# Patient Record
Sex: FEMALE | Race: WHITE | NOT HISPANIC OR LATINO | Employment: FULL TIME | URBAN - METROPOLITAN AREA
[De-identification: names, ages, dates, MRNs, and addresses within clinical notes are randomized per-mention and may not be internally consistent; named-entity substitution may affect disease eponyms.]

---

## 2017-08-02 ENCOUNTER — HOSPITAL ENCOUNTER (OUTPATIENT)
Dept: GENERAL RADIOLOGY | Facility: HOSPITAL | Age: 52
Discharge: HOME | End: 2017-08-02
Attending: INTERNAL MEDICINE | Admitting: INTERNAL MEDICINE

## 2017-08-02 ENCOUNTER — TRANSCRIBE ORDERS (OUTPATIENT)
Dept: ADMINISTRATIVE | Facility: HOSPITAL | Age: 52
End: 2017-08-02

## 2017-08-02 DIAGNOSIS — R05.3 CHRONIC COUGH: Primary | ICD-10-CM

## 2017-08-02 DIAGNOSIS — R05.3 CHRONIC COUGH: ICD-10-CM

## 2017-08-02 PROCEDURE — 71020 HC CHEST PA AND LATERAL: CPT

## 2017-08-07 ENCOUNTER — TRANSCRIBE ORDERS (OUTPATIENT)
Dept: ADMINISTRATIVE | Facility: HOSPITAL | Age: 52
End: 2017-08-07

## 2017-08-07 DIAGNOSIS — R01.1 HEART MURMUR: Primary | ICD-10-CM

## 2017-08-10 ENCOUNTER — APPOINTMENT (OUTPATIENT)
Dept: CARDIOLOGY | Facility: HOSPITAL | Age: 52
End: 2017-08-10
Attending: INTERNAL MEDICINE

## 2018-08-10 ENCOUNTER — LAB (OUTPATIENT)
Dept: LAB | Facility: HOSPITAL | Age: 53
End: 2018-08-10

## 2018-08-10 ENCOUNTER — TRANSCRIBE ORDERS (OUTPATIENT)
Dept: LAB | Facility: HOSPITAL | Age: 53
End: 2018-08-10

## 2018-08-10 DIAGNOSIS — N95.1 MENOPAUSAL SYMPTOMS: Primary | ICD-10-CM

## 2018-08-10 DIAGNOSIS — N95.1 MENOPAUSAL SYMPTOMS: ICD-10-CM

## 2018-08-10 LAB
ESTRADIOL SERPL HS-MCNC: 91 PG/ML
FSH SERPL-ACNC: 29.1 MIU/ML
PROGEST SERPL-MCNC: 0.49 NG/ML
TESTOST SERPL-MCNC: 19.38 NG/DL (ref 0–780.1)

## 2018-08-10 PROCEDURE — 36415 COLL VENOUS BLD VENIPUNCTURE: CPT

## 2018-08-10 PROCEDURE — 84144 ASSAY OF PROGESTERONE: CPT

## 2018-08-10 PROCEDURE — 84403 ASSAY OF TOTAL TESTOSTERONE: CPT

## 2018-08-10 PROCEDURE — 83001 ASSAY OF GONADOTROPIN (FSH): CPT

## 2018-08-10 PROCEDURE — 82670 ASSAY OF TOTAL ESTRADIOL: CPT

## 2020-09-09 ENCOUNTER — HOSPITAL ENCOUNTER (OUTPATIENT)
Dept: GENERAL RADIOLOGY | Facility: HOSPITAL | Age: 55
Discharge: HOME | End: 2020-09-09
Admitting: INTERNAL MEDICINE

## 2020-09-09 ENCOUNTER — TRANSCRIBE ORDERS (OUTPATIENT)
Dept: ADMINISTRATIVE | Facility: HOSPITAL | Age: 55
End: 2020-09-09

## 2020-09-09 DIAGNOSIS — M79.672 PAIN OF LEFT HEEL: ICD-10-CM

## 2020-09-09 DIAGNOSIS — M25.511 RIGHT SHOULDER PAIN, UNSPECIFIED CHRONICITY: ICD-10-CM

## 2020-09-09 DIAGNOSIS — M25.511 RIGHT SHOULDER PAIN, UNSPECIFIED CHRONICITY: Primary | ICD-10-CM

## 2020-09-09 PROCEDURE — 73030 X-RAY EXAM OF SHOULDER: CPT

## 2020-09-09 PROCEDURE — 73650 X-RAY EXAM OF HEEL: CPT

## 2020-09-15 ENCOUNTER — TRANSCRIBE ORDERS (OUTPATIENT)
Dept: ADMINISTRATIVE | Facility: HOSPITAL | Age: 55
End: 2020-09-15

## 2020-09-15 DIAGNOSIS — I34.1 MITRAL VALVE PROLAPSE: Primary | ICD-10-CM

## 2020-09-22 ENCOUNTER — HOSPITAL ENCOUNTER (OUTPATIENT)
Dept: CARDIOLOGY | Facility: HOSPITAL | Age: 55
Discharge: HOME | End: 2020-09-22
Admitting: INTERNAL MEDICINE

## 2020-09-22 VITALS — HEIGHT: 70 IN | WEIGHT: 148 LBS | BODY MASS INDEX: 21.19 KG/M2

## 2020-09-22 DIAGNOSIS — I34.1 MITRAL VALVE PROLAPSE: ICD-10-CM

## 2020-09-22 LAB
BH CV ECHO MEAS - AO ROOT AREA (BSA CORRECTED): 1.4
BH CV ECHO MEAS - AO ROOT AREA: 4.9 CM^2
BH CV ECHO MEAS - AO ROOT DIAM: 2.5 CM
BH CV ECHO MEAS - ASC AORTA: 2.5 CM
BH CV ECHO MEAS - BSA(HAYCOCK): 1.8 M^2
BH CV ECHO MEAS - BSA: 1.8 M^2
BH CV ECHO MEAS - BZI_BMI: 21.2 KILOGRAMS/M^2
BH CV ECHO MEAS - BZI_METRIC_HEIGHT: 177.8 CM
BH CV ECHO MEAS - BZI_METRIC_WEIGHT: 67.1 KG
BH CV ECHO MEAS - EDV(CUBED): 96.7 ML
BH CV ECHO MEAS - EDV(MOD-SP2): 48 ML
BH CV ECHO MEAS - EDV(MOD-SP4): 61 ML
BH CV ECHO MEAS - EDV(TEICH): 96.8 ML
BH CV ECHO MEAS - EF(CUBED): 71.2 %
BH CV ECHO MEAS - EF(MOD-BP): 60 %
BH CV ECHO MEAS - EF(MOD-SP2): 60.4 %
BH CV ECHO MEAS - EF(MOD-SP4): 60.7 %
BH CV ECHO MEAS - EF(TEICH): 63 %
BH CV ECHO MEAS - ESV(CUBED): 27.8 ML
BH CV ECHO MEAS - ESV(MOD-SP2): 19 ML
BH CV ECHO MEAS - ESV(MOD-SP4): 24 ML
BH CV ECHO MEAS - ESV(TEICH): 35.9 ML
BH CV ECHO MEAS - FS: 34 %
BH CV ECHO MEAS - IVS/LVPW: 0.99
BH CV ECHO MEAS - IVSD: 0.93 CM
BH CV ECHO MEAS - LA DIMENSION: 2.8 CM
BH CV ECHO MEAS - LA/AO: 1.1
BH CV ECHO MEAS - LAD MAJOR: 4.9 CM
BH CV ECHO MEAS - LAT PEAK E' VEL: 13.6 CM/SEC
BH CV ECHO MEAS - LATERAL E/E' RATIO: 5.3
BH CV ECHO MEAS - LV DIASTOLIC VOL/BSA (35-75): 33.2 ML/M^2
BH CV ECHO MEAS - LV MASS(C)D: 144.8 GRAMS
BH CV ECHO MEAS - LV MASS(C)DI: 78.8 GRAMS/M^2
BH CV ECHO MEAS - LV SYSTOLIC VOL/BSA (12-30): 13.1 ML/M^2
BH CV ECHO MEAS - LVIDD: 4.6 CM
BH CV ECHO MEAS - LVIDS: 3 CM
BH CV ECHO MEAS - LVLD AP2: 6.6 CM
BH CV ECHO MEAS - LVLD AP4: 7.8 CM
BH CV ECHO MEAS - LVLS AP2: 6.4 CM
BH CV ECHO MEAS - LVLS AP4: 7.3 CM
BH CV ECHO MEAS - LVPWD: 0.94 CM
BH CV ECHO MEAS - MED PEAK E' VEL: 11.4 CM/SEC
BH CV ECHO MEAS - MEDIAL E/E' RATIO: 6.3
BH CV ECHO MEAS - MV A MAX VEL: 43.4 CM/SEC
BH CV ECHO MEAS - MV DEC SLOPE: 356 CM/SEC^2
BH CV ECHO MEAS - MV E MAX VEL: 71.6 CM/SEC
BH CV ECHO MEAS - MV E/A: 1.6
BH CV ECHO MEAS - MV P1/2T MAX VEL: 97.7 CM/SEC
BH CV ECHO MEAS - MV P1/2T: 80.4 MSEC
BH CV ECHO MEAS - MVA P1/2T LCG: 2.3 CM^2
BH CV ECHO MEAS - MVA(P1/2T): 2.7 CM^2
BH CV ECHO MEAS - PA ACC SLOPE: 504 CM/SEC^2
BH CV ECHO MEAS - PA ACC TIME: 0.14 SEC
BH CV ECHO MEAS - PA PR(ACCEL): 14.9 MMHG
BH CV ECHO MEAS - RAP SYSTOLE: 3 MMHG
BH CV ECHO MEAS - RVDD: 2 CM
BH CV ECHO MEAS - RVSP: 21 MMHG
BH CV ECHO MEAS - SI(CUBED): 37.5 ML/M^2
BH CV ECHO MEAS - SI(MOD-SP2): 15.8 ML/M^2
BH CV ECHO MEAS - SI(MOD-SP4): 20.1 ML/M^2
BH CV ECHO MEAS - SI(TEICH): 33.2 ML/M^2
BH CV ECHO MEAS - SV(CUBED): 68.9 ML
BH CV ECHO MEAS - SV(MOD-SP2): 29 ML
BH CV ECHO MEAS - SV(MOD-SP4): 37 ML
BH CV ECHO MEAS - SV(TEICH): 61 ML
BH CV ECHO MEAS - TAPSE (>1.6): 2 CM
BH CV ECHO MEAS - TR MAX PG: 18 MMHG
BH CV ECHO MEAS - TR MAX VEL: 212 CM/SEC
BH CV ECHO MEASUREMENTS AVERAGE E/E' RATIO: 5.73
BH CV VAS BP LEFT ARM: NORMAL MMHG
BH CV XLRA - RV BASE: 2.5 CM
BH CV XLRA - RV LENGTH: 6.5 CM
BH CV XLRA - RV MID: 2.2 CM
BH CV XLRA - TDI S': 9.2 CM/SEC
LEFT ATRIUM VOLUME INDEX: 14.7 ML/M^2
LEFT ATRIUM VOLUME: 27 ML
LV EF 2D ECHO EST: 60 %
MAXIMAL PREDICTED HEART RATE: 167 BPM
STRESS TARGET HR: 142 BPM

## 2020-09-22 PROCEDURE — 93306 TTE W/DOPPLER COMPLETE: CPT

## 2020-09-22 PROCEDURE — 93306 TTE W/DOPPLER COMPLETE: CPT | Performed by: INTERNAL MEDICINE

## 2020-10-26 ENCOUNTER — TRANSCRIBE ORDERS (OUTPATIENT)
Dept: ADMINISTRATIVE | Facility: HOSPITAL | Age: 55
End: 2020-10-26

## 2020-10-26 DIAGNOSIS — M25.511 RIGHT SHOULDER PAIN, UNSPECIFIED CHRONICITY: Primary | ICD-10-CM

## 2020-11-05 ENCOUNTER — HOSPITAL ENCOUNTER (OUTPATIENT)
Dept: MRI IMAGING | Facility: HOSPITAL | Age: 55
Discharge: HOME | End: 2020-11-05
Admitting: INTERNAL MEDICINE

## 2020-11-05 DIAGNOSIS — M25.511 RIGHT SHOULDER PAIN, UNSPECIFIED CHRONICITY: ICD-10-CM

## 2020-11-05 PROCEDURE — 73221 MRI JOINT UPR EXTREM W/O DYE: CPT

## 2020-12-21 NOTE — PROGRESS NOTES
[facility] Cardiology  Consultation note    Subjective:     Patient ID: Nohelia Joya is a 55 y.o. female.  Referring provider: Sunita Little MD     Reason for consultation:   Chief Complaint   Patient presents with   • Mitral Valve Prolapse     Consult   • Slow Heart Rate        Problem List:  1. Mitral Valve Prolapse  a. Echocardiogram: [facility] 9/22/2020- EF 60%, Mild MVP of the posterior leaflet. Minimal myxomatous leaflet changes see. Trace TR, Trace MR. RVSP 21 mmHg      No Known Allergies      Current Outpatient Medications:   •  vitamin B-12 (CYANOCOBALAMIN) 1000 MCG tablet, Take 1,000 mcg by mouth Daily., Disp: , Rfl:   •  vitamin D3 125 MCG (5000 UT) capsule capsule, Take 5,000 Units by mouth Daily., Disp: , Rfl:     HPI:      Nohelia Joya is a 55 y.o. female who present today to establish care for her mitral valve prolapse.  She is not having any problems currently but had a follow-up echo for history of mitral valve prolapse with an echocardiogram performed many years ago.  She has occasional palpitations but they are not bothersome to her.  They have been ongoing for some time and are not frequent.  She walks and jumps on the trampoline 3-4 times a week.    Cardiac Risk Factors:  The following portions of the patient's history were reviewed and updated as appropriate: allergies, current medications and problem list.Cardiac risk factors are negative for tobacco use hypertension hyperlipidemia family history of coronary disease and diabetes.    Social History     Socioeconomic History   • Marital status: Single     Spouse name: Not on file   • Number of children: Not on file   • Years of education: Not on file   • Highest education level: Not on file   Tobacco Use   • Smoking status: Never Smoker   • Smokeless tobacco: Never Used   Substance and Sexual Activity   • Alcohol use: Never     Frequency: Never   • Drug use: Defer   • Sexual activity: Defer     Family History    Problem Relation Age of Onset   • Pneumonia Mother    • Dementia Mother    Father had a history of hypercholesterolemia and hypertension    Review of Systems:    Constitution: Negative for chills, fatigue, fever, and generalized weakness.   Cardiovascular: Pertinent positives in HPI, otherwise negative.  Respiratory: Pertinent positives in HPI, otherwise negative.  HENT: Negative for ear pain, nosebleeds, and tinnitus.  Gastrointestinal: Negative for abdominal pain, constipation, diarrhea, nausea and vomiting.   Genitourinary: No urinary symptoms   Musculoskeletal: Negative for muscle cramps.  Neurological: Negative for headaches, loss of balance, numbness, and symptoms of stroke.  Psychiatric: Normal mental status.           Objective:     Vitals:    12/23/20 0850   BP: 128/68   Pulse: (!) 46   Temp: 97.5 °F (36.4 °C)   SpO2: 99%         GENERAL: This is a well-developed, well-nourished, white female who is in no acute distress. Alert and oriented x3. Normal mood and affect.   SKIN: Pink and warm without rash or abnormality noted.   HEENT: Head is normocephalic and atraumatic. Pupils are equal and reactive to light bilaterally. Mucous membranes are pink and moist.   NECK: Supple without lymphadenopathy or thyromegaly. There is no jugular venous distention at 30°.  LUNGS: Clear to auscultation bilaterally without wheezing, rhonchi, or rales noted.   CARDIOVASCULAR: The heart has a regular rate with a normal S1 and physiologic split S2. There is no murmur, gallop, rub, or click appreciated. The PMI is nondisplaced. Carotid upstrokes are 2+ and symmetrical without bruits.   ABDOMEN: Soft and nondistended with positive bowel sounds x4. The patient denies tenderness of palpitation.   MUSCULOSKELETAL: There are no obvious bony abnormalities. Normal range of tenderness to palpation.   NEUROLOGICAL: Nonfocal.   PERIPHERAL VASCULAR: Femoral pulses are 2+ and symmetrical without bruits. Posterior tibial and dorsalis pedis  pulses are 2+ and symmetrical. There is no peripheral edema.     Diagnostic Data (reviewed):      Labs: 9/2/2020    Hgb  13.3g/dL  HCT 38.9%    BUN  17 mg/dL  Cr  0.80 mg/dL  NA  142 mmol/L  K+  4.1 mmol/L  Ca  9.8 mg/dL  Chl  107 mmol/L  Albumin 4.5 g/dL    TSH  4.26 uIU/mL  Free      Thyroxine 1.20 ng/dL    Total Chol 181 mg/dL  Trig  67 mg/dL  HDL  57.1 mg/dL  LDL  113.2 mg/dL    Bit b12 347 pg/mL  Vit D  22.6 ng/mL         ECG 12 Lead    Date/Time: 12/23/2020 9:27 AM  Performed by: Rosa Maria Gibbs MD  Authorized by: Rosa Maria Gibbs MD   Previous ECG: no previous ECG available  Rhythm: sinus bradycardia  BPM: 46  Comments: Cannot exclude previous anterior infarct.                Assessment:       ICD-10-CM ICD-9-CM   1. Mitral valve posterior leaflet prolapse, mild I34.1 424.0   We discussed symptoms of dyspnea or an increasing number of palpitations should her mitral valve prolapse ever worsen.  Based on her minimal myxomatous change and very mild posterior leaflet prolapse I suspect that it will not worsen over time.        Plan:     1. No further cardiac evaluation is required at this time.  2. Follow-up as needed.      Rosa Maria Gibbs MD, [facility]

## 2020-12-23 ENCOUNTER — CONSULT (OUTPATIENT)
Dept: CARDIOLOGY | Facility: CLINIC | Age: 55
End: 2020-12-23

## 2020-12-23 VITALS
TEMPERATURE: 97.5 F | DIASTOLIC BLOOD PRESSURE: 68 MMHG | WEIGHT: 143.2 LBS | HEIGHT: 70 IN | OXYGEN SATURATION: 99 % | SYSTOLIC BLOOD PRESSURE: 128 MMHG | HEART RATE: 46 BPM | BODY MASS INDEX: 20.5 KG/M2

## 2020-12-23 DIAGNOSIS — I34.1 MITRAL VALVE POSTERIOR LEAFLET PROLAPSE: Primary | ICD-10-CM

## 2020-12-23 PROCEDURE — 99203 OFFICE O/P NEW LOW 30 MIN: CPT | Performed by: INTERNAL MEDICINE

## 2020-12-23 PROCEDURE — 93000 ELECTROCARDIOGRAM COMPLETE: CPT | Performed by: INTERNAL MEDICINE

## 2020-12-23 RX ORDER — LANOLIN ALCOHOL/MO/W.PET/CERES
1000 CREAM (GRAM) TOPICAL DAILY
COMMUNITY
End: 2023-02-14

## 2023-02-07 DIAGNOSIS — Z00.6 EXAMINATION FOR NORMAL COMPARISON OR CONTROL IN CLINICAL RESEARCH: Primary | ICD-10-CM

## 2023-02-14 ENCOUNTER — OFFICE VISIT (OUTPATIENT)
Dept: PULMONOLOGY | Facility: CLINIC | Age: 58
End: 2023-02-14
Payer: COMMERCIAL

## 2023-02-14 VITALS
HEART RATE: 63 BPM | TEMPERATURE: 98.4 F | HEIGHT: 70 IN | WEIGHT: 155 LBS | SYSTOLIC BLOOD PRESSURE: 122 MMHG | RESPIRATION RATE: 14 BRPM | BODY MASS INDEX: 22.19 KG/M2 | OXYGEN SATURATION: 99 % | DIASTOLIC BLOOD PRESSURE: 68 MMHG

## 2023-02-14 DIAGNOSIS — R91.1 PULMONARY NODULE: Primary | ICD-10-CM

## 2023-02-14 PROCEDURE — 99204 OFFICE O/P NEW MOD 45 MIN: CPT | Performed by: INTERNAL MEDICINE

## 2023-02-14 NOTE — PROGRESS NOTES
[facility]     Patient Name: Nohelia Joya    Primary Care Physician: Sunita Little MD    Referring Physician: Sunita Little MD    Chief Complaint:    Chief Complaint   Patient presents with   • Lung Nodule     Subjective      History of Present Illness: Nohelia Joya is a 58 y.o. female who is here today for evaluation of abnormal thoracic imaging.  Patient denies history of underlying pulmonary disease.  She is a non-smoker. A right middle lobe nodule (reportedly 6 mm in diameter) was initially discovered in 2022.  This was followed up in February 2023 which showed overall stability in terms of size, shape.  Patient referred to pulmonary by PCP to evaluate serial imaging and provide recommendations on follow-up.    Review of Systems: Fourteen point review of system performed and negative except for that mentioned in HPI.     Past Medical History:   Past Medical History:   Diagnosis Date   • Heart murmur    • History of echocardiogram    • Mitral valve prolapse      Past Surgical History:   Past Surgical History:   Procedure Laterality Date   • LIPOMA EXCISION      left shoulder   • WISDOM TOOTH EXTRACTION       Family History:   Family History   Problem Relation Age of Onset   • Pneumonia Mother    • Dementia Mother      Social History:   Social History     Socioeconomic History   • Marital status: Single   Tobacco Use   • Smoking status: Never   • Smokeless tobacco: Never   Substance and Sexual Activity   • Alcohol use: Never   • Drug use: Defer   • Sexual activity: Defer     Medications:     Current Outpatient Medications:   •  vitamin B-12 (CYANOCOBALAMIN) 1000 MCG tablet, Take 1,000 mcg by mouth Daily., Disp: , Rfl:   •  vitamin D3 125 MCG (5000 UT) capsule capsule, Take 5,000 Units by mouth Daily., Disp: , Rfl:     Allergies:   No Known Allergies    Immunizations:   Immunization History   Administered Date(s) Administered   • COVID-19 (PFIZER) PURPLE CAP 03/12/2021,  04/02/2021, 01/10/2022     Objective     Physical Exam:  Vitals:    02/14/23 1256   BP: 122/68   Pulse: 63   Resp: 14   Temp: 98.4 °F (36.9 °C)   SpO2: 99%     General: The patient appears in no acute distress. Alert, cooperative and interactive.  HEENT:NC/AT, PERRL, Normal nasal mucosa, MMM.  Neck: Trachea midline, No masses.  Lymphadenopathy: No palpable anterior cervical, submandibular, submental, or posterior cervical lymphadenopathy. No palpable supra- or infraclavicular lymphadenopathy.   Chest: Clear to auscultation bilaterally. No wheezing, rhonchi, or rales. Normal work of breathing. Equal chest rise.  Cardiac: Regular rhythm, normal rate, S1S2 auscultated. No murmurs, rubs or gallops.   Extremities: No lower extremity edema. No clubbing or cyanosis.  Skin: No rashes, open wounds, or bruising. Warm, dry, well-perfused.  Neuro: Motor power grossly intact bilaterally. Sensation intact. Speech fluid and fluent. Thought process coherent.  Psych: Alert and oriented x3. Mood stable.    Assessment / Plan      # Pulmonary nodule    Personally reviewed CT chest.  There is a right middle lobe nodule approximately 5 to 6 mm in diameter.  Patient denies constitutional symptoms, cough, weight loss, shortness of breath, chest pain.  She is a never smoker.  Risk of underlying malignancy remains low.  We will continue to monitor at 1x year interval based on Fleischner criteria.  If stable after 2-3x years may discontinue serial imaging.  Discussed this plan with patient.  Will follow-up in clinic in February 2024 after repeat CT.    I discussed the diagnosis, evaluation, and  treatment options with the patient and/or appropriate family members.    Thank you for allowing me to participate in the care of Nohelia. Please feel free to contact me with any questions or concerns.    Time: Level of service justified based on 50 minutes spent in patient care on this date of service including, but not limited to: preparing to see the  patient, obtaining and/or reviewing history, performing medically appropriate examination, ordering tests/medicine/procedures, independently interpreting results, documenting clinical information in EHR, and counseling/education of patient/family/caregiver (excluding time spent on other separate services such as performing procedures or test interpretation, if applicable). (Level 4 45-59 minutes; Level 5 60-74 minutes)    -- Phill Mirza MD  Pulmonary, Critical Care and Sleep Medicine

## 2023-02-16 DIAGNOSIS — R91.1 LUNG NODULE: ICD-10-CM

## 2024-02-05 ENCOUNTER — HOSPITAL ENCOUNTER (OUTPATIENT)
Dept: CT IMAGING | Facility: HOSPITAL | Age: 59
Discharge: HOME | End: 2024-02-05
Admitting: INTERNAL MEDICINE
Payer: COMMERCIAL

## 2024-02-05 DIAGNOSIS — R91.1 LUNG NODULE: ICD-10-CM

## 2024-02-05 PROCEDURE — 71250 CT THORAX DX C-: CPT

## 2024-05-28 ENCOUNTER — OFFICE VISIT (OUTPATIENT)
Dept: PULMONOLOGY | Facility: CLINIC | Age: 59
End: 2024-05-28
Payer: COMMERCIAL

## 2024-05-28 VITALS
DIASTOLIC BLOOD PRESSURE: 70 MMHG | BODY MASS INDEX: 22.75 KG/M2 | OXYGEN SATURATION: 98 % | SYSTOLIC BLOOD PRESSURE: 122 MMHG | TEMPERATURE: 97.6 F | WEIGHT: 158.9 LBS | HEART RATE: 60 BPM | HEIGHT: 70 IN

## 2024-05-28 DIAGNOSIS — R91.1 PULMONARY NODULE: Primary | ICD-10-CM

## 2024-05-28 PROCEDURE — 94726 PLETHYSMOGRAPHY LUNG VOLUMES: CPT | Performed by: NURSE PRACTITIONER

## 2024-05-28 PROCEDURE — 94375 RESPIRATORY FLOW VOLUME LOOP: CPT | Performed by: NURSE PRACTITIONER

## 2024-05-28 PROCEDURE — 99214 OFFICE O/P EST MOD 30 MIN: CPT | Performed by: NURSE PRACTITIONER

## 2024-05-28 PROCEDURE — 94729 DIFFUSING CAPACITY: CPT | Performed by: NURSE PRACTITIONER

## 2024-05-28 NOTE — PROGRESS NOTES
[facility] Pulmonary Follow up    CHIEF COMPLAINT    Pulmonary nodule    HISTORY OF PRESENT ILLNESS    Nohelia Joya is a 59 y.o.female here today for follow-up.  She was last seen in the office by Dr. Mirza in February 2023.  She had a right middle lobe nodule initially discovered in 2022.  She had a repeat CT scan in 2023 that showed stability in the nodule.  She had a repeat CT scan in February 2024 and is here today to discuss results.    She did have a cold over the winter and required antibiotics but has not had anything since then.  She denies any breathing difficulties.    She denies any sputum production or hemoptysis.  She has a chest pain or palpitations.  Denies any lower extremity edema or calf tenderness.    She denies any reflux symptoms.    She is a lifetime non-smoker.  She has no first-degree relatives with history of lung cancer.    Patient Active Problem List   Diagnosis    Mitral valve posterior leaflet prolapse    Pulmonary nodule       No Known Allergies    Current Outpatient Medications:     vitamin D3 125 MCG (5000 UT) capsule capsule, Take 1 capsule by mouth Daily., Disp: , Rfl:   MEDICATION LIST AND ALLERGIES REVIEWED.    Social History     Tobacco Use    Smoking status: Never     Passive exposure: Past    Smokeless tobacco: Never   Vaping Use    Vaping status: Never Used   Substance Use Topics    Alcohol use: Never    Drug use: Never       FAMILY AND SOCIAL HISTORY REVIEWED.    Review of Systems   Constitutional:  Negative for activity change, appetite change, fatigue, fever and unexpected weight change.   HENT:  Negative for congestion, postnasal drip, rhinorrhea, sinus pressure, sore throat and voice change.    Eyes:  Negative for visual disturbance.   Respiratory:  Negative for cough, chest tightness, shortness of breath and wheezing.    Cardiovascular:  Negative for chest pain, palpitations and leg swelling.   Gastrointestinal:  Negative for abdominal distention, abdominal pain,  "nausea and vomiting.   Endocrine: Negative for cold intolerance and heat intolerance.   Genitourinary:  Negative for difficulty urinating and urgency.   Musculoskeletal:  Negative for arthralgias, back pain and neck pain.   Skin:  Negative for color change and pallor.   Allergic/Immunologic: Negative for environmental allergies and food allergies.   Neurological:  Negative for dizziness, syncope, weakness and light-headedness.   Hematological:  Negative for adenopathy. Does not bruise/bleed easily.   Psychiatric/Behavioral:  Negative for agitation and behavioral problems.    .    /70   Pulse 60   Temp 97.6 °F (36.4 °C)   Ht 177.8 cm (70\")   Wt 72.1 kg (158 lb 14.4 oz)   SpO2 98% Comment: resting, room air  BMI 22.80 kg/m²     Immunization History   Administered Date(s) Administered    COVID-19 (PFIZER) Purple Cap Monovalent 03/12/2021, 04/02/2021, 01/10/2022    Fluzone (or Fluarix & Flulaval for VFC) >6mos 09/09/2020, 10/23/2023    Shingrix 10/22/2021, 05/16/2022    Tdap 08/01/2022       Physical Exam  Vitals and nursing note reviewed.   Constitutional:       Appearance: She is well-developed. She is not diaphoretic.   HENT:      Head: Normocephalic and atraumatic.   Eyes:      Pupils: Pupils are equal, round, and reactive to light.   Neck:      Thyroid: No thyromegaly.   Cardiovascular:      Rate and Rhythm: Normal rate and regular rhythm.      Heart sounds: Normal heart sounds. No murmur heard.     No friction rub. No gallop.   Pulmonary:      Effort: Pulmonary effort is normal. No respiratory distress.      Breath sounds: Normal breath sounds. No wheezing or rales.   Chest:      Chest wall: No tenderness.   Abdominal:      General: Bowel sounds are normal.      Palpations: Abdomen is soft.      Tenderness: There is no abdominal tenderness.   Musculoskeletal:         General: Normal range of motion.      Cervical back: Normal range of motion and neck supple.   Lymphadenopathy:      Cervical: No " cervical adenopathy.   Skin:     General: Skin is warm and dry.      Capillary Refill: Capillary refill takes less than 2 seconds.   Neurological:      Mental Status: She is alert and oriented to person, place, and time.   Psychiatric:         Behavior: Behavior normal.           RESULTS    Spirometry Interpretation: FVC 3.21 81% predicted, FEV1 2.41 77% predicted, FEV1/FVC 75% predicted, TLC 5.06 81% predicted, DLCO 91% predicted, no obstruction, no restriction normal diffusion.    CT Chest Without Contrast Diagnostic    Result Date: 2/6/2024  Impression: 1. Approximately 5.5 x 2 mm right middle lobe nodule or nodular scar, similar to what is described in the patient's chart. If patient's outside CT scans can be made available for comparison, an addendum report be issued. 2. No evidence of active chest disease elsewhere. 3. Incidentally noted pectus excavatum. Electronically Signed: Camacho Dodge MD  2/6/2024 1:43 PM EST  Workstation ID: [id number]       PROBLEM LIST    Problem List Items Addressed This Visit          Pulmonary and Pneumonias    Pulmonary nodule - Primary         DISCUSSION    Ms. Joya was here for follow-up.  She had a pulmonary nodule initially found in 2022 that measured 6 mm.  These CT scan that she had performed in February shows a pulmonary nodule 5.5 x 2 mm in the right middle lobe.  This is similar to her previous CT scans at outside facilities.  Unfortunately there was no CT scan to compare at at our facility.    We reviewed her PFTs in the office today she has no obstruction or restriction.  She does not meet qualifications for inhalers at this time.    Due to her being a non-smoker and no personal health history of first-degree relatives with lung cancer she is considered a low risk for malignancy of the small nodule.  The nodule has been followed for 2 full years and is considered benign.  She does not require any further imaging for this nodule.    She will follow-up as needed in our  office.    I personally spent a total of 31 minutes on patient visit today including chart review, face to face with the patient obtaining the history and physical exam, review of pertinent images and tests, counseling and discussion and/or coordination of care as described above, and documentation.  Total time excludes time spent on other separate services such as performing procedures or test interpretation, if applicable.        Maryjanealexandra Gregorio, APRN  05/28/202410:39 EDT  Electronically signed     Please note that portions of this note were completed with a voice recognition program.        CC: Sunita iLttle MD

## 2025-08-07 PROCEDURE — 87086 URINE CULTURE/COLONY COUNT: CPT | Performed by: FAMILY MEDICINE
